# Patient Record
Sex: FEMALE | Race: WHITE | NOT HISPANIC OR LATINO | URBAN - METROPOLITAN AREA
[De-identification: names, ages, dates, MRNs, and addresses within clinical notes are randomized per-mention and may not be internally consistent; named-entity substitution may affect disease eponyms.]

---

## 2017-02-04 ENCOUNTER — EMERGENCY (EMERGENCY)
Facility: HOSPITAL | Age: 37
LOS: 1 days | End: 2017-02-04
Admitting: EMERGENCY MEDICINE
Payer: SELF-PAY

## 2017-02-04 PROCEDURE — 99283 EMERGENCY DEPT VISIT LOW MDM: CPT

## 2017-05-15 ENCOUNTER — EMERGENCY (EMERGENCY)
Facility: HOSPITAL | Age: 37
LOS: 1 days | Discharge: HOME | End: 2017-05-15
Attending: EMERGENCY MEDICINE | Admitting: EMERGENCY MEDICINE
Payer: SELF-PAY

## 2017-05-15 VITALS
OXYGEN SATURATION: 100 % | SYSTOLIC BLOOD PRESSURE: 112 MMHG | HEART RATE: 104 BPM | RESPIRATION RATE: 18 BRPM | DIASTOLIC BLOOD PRESSURE: 71 MMHG | TEMPERATURE: 98 F | WEIGHT: 199.96 LBS | HEIGHT: 68 IN

## 2017-05-15 DIAGNOSIS — M54.5 LOW BACK PAIN: ICD-10-CM

## 2017-05-15 PROCEDURE — 99282 EMERGENCY DEPT VISIT SF MDM: CPT

## 2017-05-15 PROCEDURE — 99284 EMERGENCY DEPT VISIT MOD MDM: CPT

## 2017-05-15 RX ORDER — METHOCARBAMOL 500 MG/1
2 TABLET, FILM COATED ORAL
Qty: 20 | Refills: 0 | OUTPATIENT
Start: 2017-05-15 | End: 2017-05-20

## 2017-05-15 RX ORDER — IBUPROFEN 200 MG
1 TABLET ORAL
Qty: 30 | Refills: 0 | OUTPATIENT
Start: 2017-05-15 | End: 2017-06-14

## 2017-05-15 RX ORDER — METHOCARBAMOL 500 MG/1
1 TABLET, FILM COATED ORAL
Qty: 0 | Refills: 0 | COMMUNITY
Start: 2017-05-15 | End: 2017-05-20

## 2017-05-15 NOTE — ED ADULT NURSE NOTE - CHPI ED SYMPTOMS NEG
no neck tenderness/no fatigue/no bowel dysfunction/no difficulty bearing weight/no constipation/no tingling/no numbness/no anorexia/no bladder dysfunction

## 2017-05-15 NOTE — ED ADULT NURSE NOTE - OBJECTIVE STATEMENT
Pt c/o chronic low back pain. Sees  back specialist. Pain was aggravated today by lifting her daughter.

## 2017-05-15 NOTE — ED PROVIDER NOTE - OBJECTIVE STATEMENT
Dr. Ly Note: 36F with recurrence of L lower back pain this afternoon after lifting kiods out of tub, worse with movement, better with rest, assoc with shooting pain down leg, no fever, vomiting.

## 2017-05-15 NOTE — ED ADULT TRIAGE NOTE - CHIEF COMPLAINT QUOTE
I aggravated my back picking my daughter up out of the bath this morning. Hx od fusion to L4 5 & S1, L2, 3&4 herniated/degen discs.

## 2018-03-22 ENCOUNTER — EMERGENCY (EMERGENCY)
Facility: HOSPITAL | Age: 38
LOS: 1 days | Discharge: HOME | End: 2018-03-22
Attending: EMERGENCY MEDICINE | Admitting: EMERGENCY MEDICINE
Payer: SELF-PAY

## 2018-03-22 VITALS
SYSTOLIC BLOOD PRESSURE: 128 MMHG | HEIGHT: 68 IN | WEIGHT: 184.97 LBS | OXYGEN SATURATION: 98 % | HEART RATE: 89 BPM | RESPIRATION RATE: 15 BRPM | DIASTOLIC BLOOD PRESSURE: 74 MMHG | TEMPERATURE: 98 F

## 2018-03-22 VITALS
HEART RATE: 76 BPM | RESPIRATION RATE: 16 BRPM | SYSTOLIC BLOOD PRESSURE: 102 MMHG | OXYGEN SATURATION: 97 % | DIASTOLIC BLOOD PRESSURE: 70 MMHG

## 2018-03-22 PROCEDURE — 99282 EMERGENCY DEPT VISIT SF MDM: CPT

## 2018-03-22 PROCEDURE — 99284 EMERGENCY DEPT VISIT MOD MDM: CPT

## 2018-03-22 RX ORDER — LIDOCAINE 4 G/100G
1 CREAM TOPICAL ONCE
Qty: 0 | Refills: 0 | Status: COMPLETED | OUTPATIENT
Start: 2018-03-22 | End: 2018-03-22

## 2018-03-22 RX ORDER — LIDOCAINE 4 G/100G
1 CREAM TOPICAL
Qty: 4 | Refills: 0 | OUTPATIENT
Start: 2018-03-22 | End: 2018-03-25

## 2018-03-22 RX ADMIN — Medication 50 MILLIGRAM(S): at 11:24

## 2018-03-22 RX ADMIN — LIDOCAINE 1 PATCH: 4 CREAM TOPICAL at 11:24

## 2018-03-22 NOTE — ED ADULT NURSE REASSESSMENT NOTE - NS ED NURSE REASSESS COMMENT FT1
pt re evaluated by md and to be d'c/d  pt discharged stable and ambulatory in nad at present d/c instruction reinforced and pt verbalized understanding vital signs as charted  pt advised nop shoveling no lifting or pushing or pulling

## 2018-03-22 NOTE — ED ADULT NURSE NOTE - CHPI ED SYMPTOMS NEG
no bowel dysfunction/no bladder dysfunction/no difficulty bearing weight/no motor function loss/no neck tenderness

## 2018-03-22 NOTE — ED PROVIDER NOTE - MEDICAL DECISION MAKING DETAILS
36 y/o f with chronic LBP with exacerbation of pain s/p shoveling snow yesterday, no fall/no incontinence, no neuro deficits; took motrin and robaxin pta, will d/x home pain meds, steroids, lidoderm patch, f/u spine specialist

## 2018-03-22 NOTE — ED PROVIDER NOTE - OBJECTIVE STATEMENT
38 y/o F with hx of chronic back pain, herniated discs of L2-S1 with spinal fusion presents with c/o L lower back 38 y/o F with hx of chronic back pain, herniated discs of L2-S1 with hx spinal fusion surgery presents with c/o exacerbation of L lower back since yesterday after shoveling snow. Pt states that she took motrin 800mg and robaxin 750mg at 9am PTA. Denies numbness, tingling, focal weakness, fall, bowel/bladder incontinence, urinary symptoms or other symptoms. States that she follows up at  [facility]

## 2018-03-22 NOTE — ED PROVIDER NOTE - CHPI ED SYMPTOMS NEG
no bladder dysfunction/no bowel dysfunction/no motor function loss/no tingling/no neck tenderness/no numbness

## 2018-03-22 NOTE — ED PROVIDER NOTE - ATTENDING CONTRIBUTION TO CARE
Pt is a 38 yo female who presents to the ED with a cc of back pain.  PMHx of lumbar disc herniation with h/o fusion of L4-S1.  Pt reports that her pain has been well controlled.  yesterday she was outside shoveling snow for several hours.  At one point she lifted some heavy snow and felt something pull in her left lower back.  Denies any trauma to the back.  Since then she has been experiencing progressive left lower back pain with radiation down her left leg.  She took Motrin and Robaxin this morning around 9 am with little to no relief.  Pt denies numbness in her lower ext.  Denies loss of bowel or bladder function.  She has had epidural injections for her back pain previously but her last injection was 8-9 months ago.  Denies fever, chills, N/V, CP, SOB abd pain.  On exam pt lying in bed mild pain distress noted.  NCAT, PERRL, EOMI, heart RRR, lungs CTA, abd soft NT/ND, no midline C/T TTP no midline lumbar TTP diffuse left sided lumbar TTP with increased tone and spasm noted no overlying skin changes, positive straight leg raise on the left to 10 degrees.  Sensation intact to bilateral lower ext with positive pedal pulses bilaterally.  Agree with above plan of care.  Pt reports that this is her typical back pain.  Has follow up with [facility].  Will treat outpatient for flare.

## 2018-03-22 NOTE — ED PROVIDER NOTE - NS CPE EDP MUSC LUMBAR LOC
+L lumbar paraspinal ttp with +SLR at 30 degrees, no midline tenderness, +old midline surgical scar noted, no erythema/ecchymosis noted, NVI/tenderness

## 2018-06-29 ENCOUNTER — EMERGENCY (EMERGENCY)
Facility: HOSPITAL | Age: 38
LOS: 1 days | Discharge: HOME | End: 2018-06-29
Attending: EMERGENCY MEDICINE | Admitting: EMERGENCY MEDICINE
Payer: SELF-PAY

## 2018-06-29 PROCEDURE — 99283 EMERGENCY DEPT VISIT LOW MDM: CPT

## 2018-06-30 VITALS
DIASTOLIC BLOOD PRESSURE: 72 MMHG | OXYGEN SATURATION: 100 % | WEIGHT: 190.04 LBS | TEMPERATURE: 98 F | HEIGHT: 68 IN | RESPIRATION RATE: 18 BRPM | SYSTOLIC BLOOD PRESSURE: 107 MMHG | HEART RATE: 88 BPM

## 2018-06-30 VITALS
SYSTOLIC BLOOD PRESSURE: 109 MMHG | RESPIRATION RATE: 17 BRPM | OXYGEN SATURATION: 100 % | DIASTOLIC BLOOD PRESSURE: 76 MMHG | TEMPERATURE: 98 F | HEART RATE: 90 BPM

## 2018-06-30 RX ORDER — CYCLOBENZAPRINE HYDROCHLORIDE 10 MG/1
1 TABLET, FILM COATED ORAL
Qty: 15 | Refills: 0 | OUTPATIENT
Start: 2018-06-30

## 2018-06-30 NOTE — ED PROVIDER NOTE - MEDICAL DECISION MAKING DETAILS
38 yo F with hx of spinal fusion co back pain since straining back last night. Refused Xrays. PE unrevealing. Plan - Rx pain meds. Ortho FU.

## 2018-06-30 NOTE — ED ADULT NURSE NOTE - CHPI ED SYMPTOMS NEG
no constipation/no anorexia/no difficulty bearing weight/no motor function loss/no bowel dysfunction/no fatigue/no bladder dysfunction/no numbness/no tingling/no neck tenderness

## 2018-06-30 NOTE — ED PROVIDER NOTE - NEUROLOGICAL, MLM
Alert and oriented, no focal deficits, no motor or sensory deficits. DTRs +2 throughout. No saddle anesthesia.

## 2018-06-30 NOTE — ED PROVIDER NOTE - OBJECTIVE STATEMENT
38 yo F with chronic back pain CO left lower back pain since straining back last night. Denies other injury or symptom. Requesting Rx for Percocet and Flexeril. ISTOP review reveals one prior Rx in March 2018. 6 ER visits in past 2 yrs for back pain. Hx of Lumbar surgery.

## 2018-06-30 NOTE — ED PROVIDER NOTE - CHPI ED SYMPTOMS NEG
no difficulty bearing weight/no fatigue/no tingling/no motor function loss/no neck tenderness/no bladder dysfunction/no anorexia/no constipation/no numbness/no bowel dysfunction

## 2018-08-27 ENCOUNTER — EMERGENCY (EMERGENCY)
Facility: HOSPITAL | Age: 38
LOS: 1 days | Discharge: LEFT BEFORE DISCHARGE | End: 2018-08-27
Attending: EMERGENCY MEDICINE | Admitting: EMERGENCY MEDICINE
Payer: SELF-PAY

## 2018-08-27 VITALS
DIASTOLIC BLOOD PRESSURE: 76 MMHG | HEIGHT: 68 IN | TEMPERATURE: 99 F | OXYGEN SATURATION: 99 % | HEART RATE: 99 BPM | WEIGHT: 199.96 LBS | SYSTOLIC BLOOD PRESSURE: 106 MMHG | RESPIRATION RATE: 16 BRPM

## 2018-08-27 DIAGNOSIS — Z98.1 ARTHRODESIS STATUS: Chronic | ICD-10-CM

## 2018-08-27 PROBLEM — M43.26 FUSION OF SPINE, LUMBAR REGION: Chronic | Status: ACTIVE | Noted: 2017-05-15

## 2018-08-27 PROCEDURE — 99283 EMERGENCY DEPT VISIT LOW MDM: CPT

## 2018-08-27 RX ORDER — LIDOCAINE 4 G/100G
1 CREAM TOPICAL ONCE
Qty: 0 | Refills: 0 | Status: COMPLETED | OUTPATIENT
Start: 2018-08-27 | End: 2018-08-27

## 2018-08-27 RX ADMIN — LIDOCAINE 1 PATCH: 4 CREAM TOPICAL at 15:41

## 2018-08-27 NOTE — ED ADULT NURSE REASSESSMENT NOTE - NS ED NURSE REASSESS COMMENT FT1
16:20-went to give patient her prednisone but she had left the ER telling staff she had to get her insurance card in her car.  Dr. Ly aware.  16:30-patient never came back from the car, patient did not get discharge instructions, Dr. Ly aware.

## 2018-08-27 NOTE — ED PROVIDER NOTE - MUSCULOSKELETAL, MLM
old well healed surgical scar noted to midline lower lumbar spine, normal range of motion, motor and sensation intact, no muscle or joint tenderness, neck supple

## 2018-08-27 NOTE — ED PROVIDER NOTE - CHPI ED SYMPTOMS NEG
no bladder dysfunction/no neck tenderness/no numbness/no bowel dysfunction/no anorexia/no fatigue/no motor function loss

## 2018-08-27 NOTE — ED ADULT NURSE NOTE - OBJECTIVE STATEMENT
Patient walked into ER c/o "pain to left lower back going to left hip and down left leg, injured yesterday when nephew jumped onto her back while playing."  Patient has chronic back problems to lumbar area.

## 2018-08-27 NOTE — ED PROVIDER NOTE - MEDICAL DECISION MAKING DETAILS
36 Y/O F WITH CHRONIC LBP AND LUMBAR SPINAL FUSION C/O L LOWER BACK PAIN S/P NEPHEW JUMPED ONTO HER BACK YESTERDAY, NVI/NO INCONTINENCE, WILL RX FOR LIDODERM PATCH, PREDNISONE, CONTINUE ROBAXIN FROM EARLIER, F/U PAIN MANAGEMENT

## 2018-08-27 NOTE — ED PROVIDER NOTE - OBJECTIVE STATEMENT
36 y/o F with hx of chronic back pain, lumbar spinal fusion c/o lower back pain since last night. States that her 8 y/o nephew jumped onto her back and has been having pain since. States that pain is worse to left side and radiates down her L leg. States that she took a dose of motrin 600mg and robaxin at 2:15pm prior to ER arrival. Denies numbness, tingling, focal weakness, fall, bowel/bladder incontinence or other symptoms/injuries. Previous charts review shows multiple ER visits for back pain and ISTOP reveals last prescribed percocet in 6/18. Pt requesting rx for percocet and flexeril. States that she has robaxin at home.

## 2018-08-27 NOTE — ED PROVIDER NOTE - ATTENDING CONTRIBUTION TO CARE
Pt with reported lower back pain recurrent after nephew jumped on her back.  Pt with multiple ED visits for similar back pain complaints.  Pt witnessed by me ambulatory in ED, no focal neuro deficits.  Pt left prior to my complete exam, but was examined by PA.  Agree with PA assessment and plan for outpatient follow up for nonemergent back pain and to avoid narcotics at this time.

## 2020-04-21 NOTE — ED ADULT NURSE NOTE - PSH
Sent for Pt for MRI but when transport arrived, pt id refusing MRI at this time. No significant past surgical history
